# Patient Record
Sex: MALE | Race: WHITE
[De-identification: names, ages, dates, MRNs, and addresses within clinical notes are randomized per-mention and may not be internally consistent; named-entity substitution may affect disease eponyms.]

---

## 2020-08-03 ENCOUNTER — HOSPITAL ENCOUNTER (OUTPATIENT)
Dept: HOSPITAL 75 - RAD | Age: 55
End: 2020-08-03
Attending: NURSE PRACTITIONER
Payer: MEDICAID

## 2020-08-03 DIAGNOSIS — F17.210: ICD-10-CM

## 2020-08-03 DIAGNOSIS — Z12.2: Primary | ICD-10-CM

## 2020-08-03 NOTE — DIAGNOSTIC IMAGING REPORT
CT CHEST SCREENING WO



TECHNIQUE: Low-dose unenhanced CT of the chest was performed

according to the screening protocol. Coronal MIP and sagittal MPR

reformats are created. Automatic exposure controls were utilized

to keep dose as low as reasonably achievable.



INDICATION: 41-pack-year history of smoking. Current smoker.



COMPARISON: CTA chest of 04/22/2019.



FINDINGS:



Pulmonary findings: No endoluminal nodule within the trachea. No

pulmonary mass or consolidation. Mild centrilobular emphysema is

unchanged. There are few scattered micronodules measuring less

than 3 mm in size in the upper lobes. No pulmonary nodule that

would suggest a clinically active lung cancer.



Extrapulmonary findings: No pleural effusion or axillary

lymphadenopathy. No mediastinal, discrete hilar, or juxtaphrenic

lymphadenopathy. Heart is normal in size without pericardial

effusion. Normal-caliber thoracic aorta. Hepatic steatosis is

unchanged. No concerning focal osseous lesions.



IMPRESSION: 

1. Baseline screening exam is negative for features of clinically

active lung cancer.



Lung-RADS category: 1 - Negative



Recommendations: Continued annual screening with low-dose CT in

12 months.



Dictated by: 



  Dictated on workstation # [id number]